# Patient Record
Sex: MALE | Race: WHITE | Employment: FULL TIME | URBAN - METROPOLITAN AREA
[De-identification: names, ages, dates, MRNs, and addresses within clinical notes are randomized per-mention and may not be internally consistent; named-entity substitution may affect disease eponyms.]

---

## 2017-06-05 ENCOUNTER — OFFICE VISIT (OUTPATIENT)
Dept: INTERNAL MEDICINE | Facility: CLINIC | Age: 37
End: 2017-06-05
Payer: COMMERCIAL

## 2017-06-05 VITALS
BODY MASS INDEX: 46.33 KG/M2 | DIASTOLIC BLOOD PRESSURE: 96 MMHG | RESPIRATION RATE: 20 BRPM | WEIGHT: 315 LBS | TEMPERATURE: 98.4 F | SYSTOLIC BLOOD PRESSURE: 138 MMHG | OXYGEN SATURATION: 97 % | HEART RATE: 94 BPM

## 2017-06-05 DIAGNOSIS — R09.81 SINUS CONGESTION: ICD-10-CM

## 2017-06-05 DIAGNOSIS — R07.0 THROAT PAIN: Primary | ICD-10-CM

## 2017-06-05 LAB
DEPRECATED S PYO AG THROAT QL EIA: NORMAL
MICRO REPORT STATUS: NORMAL
SPECIMEN SOURCE: NORMAL

## 2017-06-05 PROCEDURE — 87880 STREP A ASSAY W/OPTIC: CPT | Performed by: INTERNAL MEDICINE

## 2017-06-05 PROCEDURE — 99213 OFFICE O/P EST LOW 20 MIN: CPT | Performed by: INTERNAL MEDICINE

## 2017-06-05 PROCEDURE — 87081 CULTURE SCREEN ONLY: CPT | Performed by: INTERNAL MEDICINE

## 2017-06-05 RX ORDER — CEFUROXIME AXETIL 500 MG/1
500 TABLET ORAL 2 TIMES DAILY
Qty: 20 TABLET | Refills: 0 | Status: SHIPPED | OUTPATIENT
Start: 2017-06-05 | End: 2019-04-18

## 2017-06-05 ASSESSMENT — PAIN SCALES - GENERAL: PAINLEVEL: SEVERE PAIN (7)

## 2017-06-05 NOTE — NURSING NOTE
"Chief Complaint   Patient presents with     Pharyngitis     sore throat for five days and fever       Initial BP (!) 138/96  Pulse 94  Temp 98.4  F (36.9  C) (Temporal)  Resp 20  Wt (!) 344 lb (156 kg)  SpO2 97%  BMI 46.33 kg/m2 Estimated body mass index is 46.33 kg/(m^2) as calculated from the following:    Height as of 11/14/16: 6' 0.25\" (1.835 m).    Weight as of this encounter: 344 lb (156 kg).  Medication Reconciliation: complete    "

## 2017-06-05 NOTE — PROGRESS NOTES
SUBJECTIVE:                                                    Cristobal De La Rosa is a 36 year old male who presents to clinic today for the following health issues:      Chief Complaint   Patient presents with     Pharyngitis     sore throat for five days and fever     Sore throat and fever for 5 days.  No runny nose.      Strep is negative.      Has morning drainage.  History of sinus congestion.     No past medical history on file.  No current outpatient prescriptions on file.     No current facility-administered medications for this visit.      Social History   Substance Use Topics     Smoking status: Never Smoker     Smokeless tobacco: Current User     Types: Chew     Alcohol use 0.0 oz/week     0 Standard drinks or equivalent per week     Physical Exam  BP (!) 138/96  Pulse 94  Temp 98.4  F (36.9  C) (Temporal)  Resp 20  Wt (!) 344 lb (156 kg)  SpO2 97%  BMI 46.33 kg/m2  General Appearance-healthy, alert, mild distress  ENT-ears are clear, tonsils are touching, he is able open and close his job. Mild adenopathy  Cardiac-regular rate and rhythm  with normal S1, S2 ; no murmur, rub or gallops  Lungs-clear to auscultation    ASSESSMENT:  Young patient, 36 years old, who has 2 young children. Unfortunately his dad just passed away this weekend. She is having a sore throat for 5 days, blood pressure slightly elevated. His strep was negative but he does have large tonsils, infectious symptoms and despite only 5 days and to treat him through the stress of his father passing away in his exposures we will treat empirically at this time. Patient is given warning of side effects of Ceftin 500 mg twice a day.    Electronically signed by Sathya Villalpando MD

## 2017-06-05 NOTE — MR AVS SNAPSHOT
"              After Visit Summary   6/5/2017    Cristobal De La Rosa    MRN: [id number]           Patient Information     Date Of Birth          1980        Visit Information        Provider Department      6/5/2017 3:45 PM Sathya Villalpando MD [facility]        Today's Diagnoses     Throat pain    -  1       Follow-ups after your visit        Your next 10 appointments already scheduled     Jun 05, 2017  3:45 PM CDT   [facility] with Sath[facility]pando [facility]   [facility]    [address] [place] [id number] [phone]              Who to contact     If you have questions or need follow up information about today's clinic visit or your schedule please contact [facility] directly at [phone].  Normal or non-critical lab and imaging results will be communicated to you by MyChart, letter or phone within 4 business days after the clinic has received the results. If you do not hear from us within 7 days, please contact the clinic through MyChart or phone. If you have a critical or abnormal lab result, we will notify you by phone as soon as possible.  Submit refill requests through [facility] or call your pharmacy and they will forward the refill request to us. Please allow 3 business days for your refill to be completed.          Additional Information About Your Visit        [facility] Information     [facility] lets you send messages to your doctor, view your test results, renew your prescriptions, schedule appointments and more. To sign up, go to [email or web address] . Click on \"Log in\" on the left side of the screen, which will take you to the Welcome page. Then click on \"Sign up Now\" on the right side of the page.     You will be asked to enter the access code listed below, as well as some personal information. Please follow the directions to create your username and password.     Your access code is: [email or web address]  Expires: 9/3/2017  3:40 " PM     Your access code will  in 90 days. If you need help or a new code, please call your [facility] clinic or [phone].        Care EveryWhere ID     This is your Care EveryWhere ID. This could be used by other organizations to access your [facility] medical records  [id number]        Your Vitals Were     Pulse Temperature Respirations Pulse Oximetry BMI (Body Mass Index)       94 98.4  F (36.9  C) (Temporal) 20 97% 46.33 kg/m2        Blood Pressure from Last 3 Encounters:   17 (!) 138/96   16 (!) 136/96    Weight from Last 3 Encounters:   17 (!) 344 lb (156 kg)   16 (!) 366 lb (166 kg)              We Performed the Following     Strep, Rapid Screen        Primary Care Provider    None       No address on file        Thank you!     Thank you for choosing [facility]  for your care. Our goal is always to provide you with excellent care. Hearing back from our patients is one way we can continue to improve our services. Please take a few minutes to complete the written survey that you may receive in the mail after your visit with us. Thank you!             Your Updated Medication List - Protect others around you: Learn how to safely use, store and throw away your medicines at www.disposemymeds.org.      Notice  As of 2017  3:40 PM    You have not been prescribed any medications.

## 2017-06-07 LAB
BACTERIA SPEC CULT: NORMAL
MICRO REPORT STATUS: NORMAL
SPECIMEN SOURCE: NORMAL

## 2017-11-30 ENCOUNTER — OFFICE VISIT (OUTPATIENT)
Dept: UROLOGY | Facility: OTHER | Age: 37
End: 2017-11-30
Payer: COMMERCIAL

## 2017-11-30 VITALS — HEART RATE: 96 BPM | DIASTOLIC BLOOD PRESSURE: 88 MMHG | RESPIRATION RATE: 16 BRPM | SYSTOLIC BLOOD PRESSURE: 130 MMHG

## 2017-11-30 DIAGNOSIS — Z30.09 VASECTOMY EVALUATION: Primary | ICD-10-CM

## 2017-11-30 PROCEDURE — 99203 OFFICE O/P NEW LOW 30 MIN: CPT | Performed by: UROLOGY

## 2017-11-30 NOTE — MR AVS SNAPSHOT
After Visit Summary   11/30/2017    Cristobal De La Rosa    MRN: [id number]           Patient Information     Date Of Birth          1980        Visit Information        Provider Department      11/30/2017 9:45 AM Julian Carter MD [facility]'s Diagnoses     Vasectomy evaluation    -  1      Care Instructions    Your Vasectomy is scheduled for 12/21/2018 @ 9am .  Please call [phone] if you need to reschedule this appointment or if you have any question.     Preparation for Vasectomy:  -Shave the hair away from the base of your penis and around your testicles.  -Wear snug underwear the day of the vasectomy to support your testicles.  -Do not take aspirin, ibuprofen, advil, motrin, aleve products one week prior to your vasectomy.  -Arrange for a  if you take any medication for relaxation from the physician.      General Vasectomy Information    Vasectomy is a surgery.  If it is successful, it will be impossible for you to ever father children.  The following information regards the male sterilization done by an operation called a vasectomy.  This is done in the physician's office.    The operation done to sterilize the male is easier, safer and much less expensive than the operation done to sterilize the woman.    Sterilization should be considered permanent.  For most males, once the operation is done, it can never me undone.  There are attempts made occasionally to reconnect the tubes that have been cut during the procedure, but this is very difficult and expensive and works only about 50-70% of the time.  In order for any of the physicians in our clinic to do a vasectomy, we require that you consider this a permanent form a sterilization.    A vasectomy can be done at any time, but it is best to think about having it done when you can take at least one day off from work and any excess activities.    Your decision to have a vasectomy should only be made  with the following facts clear in mind.    1. First, a vasectomy is only one of several means of birth control.  Many form of temporary contraception are available.  If you have any questions about other methods, please discuss this with your physician.    2. A vasectomy may be unsuccessful in approximately one out of 1000 couples per year.  This occurs when the tubes which are cut during the procedure reconnect themselves.  Sterility cannot be guaranteed.    3. You should be aware that it is the current belief of the medical profession that a vasectomy procedure does not alter a male physically, physiologically or sexually.  Because each person is a unique individual, there is always the possibility of an adverse phychiatric reaction.  This can be best avoided by being very comfortable in your own mind that you want to have this done, and that you do not want to father any children in the future.  If this is not clear in your mind, this should be further discussed with your physician.    4. You will not notice a change in the volume of your ejaculate since sperm is a very small amount of the semen and it is only the sperm that is stopped from entering the ejaculate after a vasectomy.  Your prostate and seminal vesicle glands really supply most of the semen and this is not at all decreased after a vasectomy.  Also there is no effect on the male hormones.    5. You do not become sterile immediately following a vasectomy due to the fact that there is still sperm remaining in your system that must be eliminated by ejaculation.  For this reason, your sexual partner could still become pregnant for a period of time following the vasectomy operation.  It is necessary that contraceptive measures be used until you receive confirmation from your physician that you are sterile.  It takes approximately 12 ejaculations to clear the semen of sperm, but this can differ in different men.  For this reason, it is very important that  "your semen be checked for sperm before you are considered sterile, and this should be done approximately 12 weeks after your vasectomy.      6. Vasectomy has risks and benefits.  Among the risks are possible complications resulting from the procedure.  These risks include but are not limited to:   A.  Bleeding, infection, or hematoma occuring during or in the recovery period   from the procedure.   B.  Sperm granuloma or a small pea to walnut sized lump which is a collection of   scar tissue and sperm in your scrotal sack and remains permanently   C.  There may be an increased risk of prostate cancer although the data is   unclear.          Follow-ups after your visit        Your next 10 appointments already scheduled     Dec 21, 2017  9:00 AM CST   [facility]    [address] [place] [id number] [phone]              Who to contact     If you have questions or need follow up information about today's clinic visit or your schedule please contact [facility] directly at [phone].  Normal or non-critical lab and imaging results will be communicated to you by MyChart, letter or phone within 4 business days after the clinic has received the results. If you do not hear from us within 7 days, please contact the clinic through [facility] or phone. If you have a critical or abnormal lab result, we will notify you by phone as soon as possible.  Submit refill requests through [facility] or call your pharmacy and they will forward the refill request to us. Please allow 3 business days for your refill to be completed.          Additional Information About Your Visit        [facility] [facility] [employer] Information     [facility] lets you send messages to your doctor, view your test results, renew your prescriptions, schedule appointments and more. To sign up, go to [email or web address] . Click on \"Log in\" on the left side of the screen, " "which will take you to the Welcome page. Then click on \"Sign up Now\" on the right side of the page.     You will be asked to enter the access code listed below, as well as some personal information. Please follow the directions to create your username and password.     Your access code is: [email or web address]  Expires: 2017  9:42 AM     Your access code will  in 90 days. If you need help or a new code, please call your [place] clinic or [phone].        Care EveryWhere ID     This is your Care EveryWhere ID. This could be used by other organizations to access your [facility] medical records  [id number]        Your Vitals Were     Pulse Respirations                96 16           Blood Pressure from Last 3 Encounters:   17 130/88   17 (!) 138/96   16 (!) 136/96    Weight from Last 3 Encounters:   17 (!) 344 lb (156 kg)   16 (!) 366 lb (166 kg)              Today, you had the following     No orders found for display       Primary Care Provider Fax #    Physician No Ref-Primary [phone]       No address on file        Equal Access to Services     [facility]ZZ[facility] : Hadii floresita javiero Soantonio, waaxda luqadaha, qaybta kaalmada aderobertoyada, zander schwarz . So [facility] [phone].    ATENCIÓN: Si habla español, tiene a malcolm disposición servicios gratuitos de asistencia lingüística. Tj al [phone].    We comply with applicable federal civil rights laws and [place] laws. We do not discriminate on the basis of race, color, national origin, age, disability, sex, sexual orientation, or gender identity.            Thank you!     Thank you for choosing [facility]  for your care. Our goal is always to provide you with excellent care. Hearing back from our patients is one way we can continue to improve our services. Please take a few minutes to complete the written survey that you may receive in the mail after your visit with us. Thank you!   "           Your Updated Medication List - Protect others around you: Learn how to safely use, store and throw away your medicines at www.disposemymeds.org.          This list is accurate as of: 11/30/17 10:02 AM.  Always use your most recent med list.                   Brand Name Dispense Instructions for use Diagnosis    cefuroxime 500 MG tablet    CEFTIN    20 tablet    Take 1 tablet (500 mg) by mouth 2 times daily    Throat pain, Sinus congestion

## 2017-11-30 NOTE — NURSING NOTE
"Chief Complaint   Patient presents with     Consult     Vasectomy consult        Initial /88 (BP Location: Left arm, Patient Position: Chair, Cuff Size: Adult Large)  Pulse 96  Resp 16 Estimated body mass index is 46.33 kg/(m^2) as calculated from the following:    Height as of 11/14/16: 6' 0.25\" (1.835 m).    Weight as of 6/5/17: 344 lb (156 kg).  Medication Reconciliation: complete     Jennifer Jarquin RN      "

## 2017-11-30 NOTE — PROGRESS NOTES
Vasectomy Consult    Reason for visit:   Discuss as a method of birth control/sterilization.  He is interested in vasectomy scrotally.  Patient has 3 children and desires sterilization.  Does not want to use condom or having partners on birth control pills.  He has no erections problem.  He has no urinary complaints.    Current Outpatient Prescriptions   Medication Sig Dispense Refill     cefuroxime (CEFTIN) 500 MG tablet Take 1 tablet (500 mg) by mouth 2 times daily (Patient not taking: Reported on 11/30/2017) 20 tablet 0     Allergies   Allergen Reactions     Penicillins      No past medical history on file.  No past surgical history on file.   No family history on file.  Social History     Social History     Marital status:      Spouse name: N/A     Number of children: N/A     Years of education: N/A     Social History Main Topics     Smoking status: Never Smoker     Smokeless tobacco: Current User     Types: Chew     Alcohol use 0.0 oz/week     0 Standard drinks or equivalent per week     Drug use: No     Sexual activity: Yes     Partners: Female     Other Topics Concern     None     Social History Narrative       REVIEW OF SYSTEMS  =================  C: NEGATIVE for fever, chills, change in weight  I: NEGATIVE for worrisome rashes, moles or lesions  E/M: NEGATIVE for ear, mouth and throat problems  R: NEGATIVE for significant cough or SHORTNESS OF BREATH  CV:  NEGATIVE for chest pain, palpitations or peripheral edema  GI: NEGATIVE for nausea, abdominal pain, heartburn, or change in bowel habits  NEURO: NEGATIVE numbness/weakness  : see HPI  PSYCH: NEGATIVE depression/anxiety  LYmph: no new enlarged lymph nodes  Ortho: no new trauma/movements      Physical Exam:  /88 (BP Location: Left arm, Patient Position: Chair, Cuff Size: Adult Large)  Pulse 96  Resp 16   Patient is pleasant, in no acute distress, good general condition.  HEENT:  Normalcephalic, atraumatic  Lung: no evidence of respiratory  distress    Abdomen: Soft, nondistended, non tender. No masses. No rebound or guarding.   Exam: penis no d/c testis no masses bilateral vas palpated no scrotal lesion  Skin: Warm and dry.  No redness.  Neuro: grossly normal  Psych normal mood and affect  Musculoskeletal  moving all extremities        Discussed    That vasectomy is permanent method of birth control.    That vasectomy can fail due to recanalization of the vas even many months/years later.    That he needs 2 negative sperm checks to be considered sterile    That there are other methods that are not permanent and also that the sperm can be frozen for later use.    How the technique is performed, risks of infection, bleeding, damage to the testes vessels and testes atrophy    Long term complication such as chronic and difficult to treat testes pain and questionable increase incidence of prostate cancer    That the procedure can be done at the clinic or hospital OR        Plan:    Stop Aspirin  Will schedule Vasectomy in the future

## 2017-11-30 NOTE — PATIENT INSTRUCTIONS
Your Vasectomy is scheduled for 12/21/2018 @ 9am .  Please call [phone] if you need to reschedule this appointment or if you have any question.     Preparation for Vasectomy:  -Shave the hair away from the base of your penis and around your testicles.  -Wear snug underwear the day of the vasectomy to support your testicles.  -Do not take aspirin, ibuprofen, advil, motrin, aleve products one week prior to your vasectomy.  -Arrange for a  if you take any medication for relaxation from the physician.      General Vasectomy Information    Vasectomy is a surgery.  If it is successful, it will be impossible for you to ever father children.  The following information regards the male sterilization done by an operation called a vasectomy.  This is done in the physician's office.    The operation done to sterilize the male is easier, safer and much less expensive than the operation done to sterilize the woman.    Sterilization should be considered permanent.  For most males, once the operation is done, it can never me undone.  There are attempts made occasionally to reconnect the tubes that have been cut during the procedure, but this is very difficult and expensive and works only about 50-70% of the time.  In order for any of the physicians in our clinic to do a vasectomy, we require that you consider this a permanent form a sterilization.    A vasectomy can be done at any time, but it is best to think about having it done when you can take at least one day off from work and any excess activities.    Your decision to have a vasectomy should only be made with the following facts clear in mind.    1. First, a vasectomy is only one of several means of birth control.  Many form of temporary contraception are available.  If you have any questions about other methods, please discuss this with your physician.    2. A vasectomy may be unsuccessful in approximately one out of 1000 couples per year.  This occurs when the  tubes which are cut during the procedure reconnect themselves.  Sterility cannot be guaranteed.    3. You should be aware that it is the current belief of the medical profession that a vasectomy procedure does not alter a male physically, physiologically or sexually.  Because each person is a unique individual, there is always the possibility of an adverse phychiatric reaction.  This can be best avoided by being very comfortable in your own mind that you want to have this done, and that you do not want to father any children in the future.  If this is not clear in your mind, this should be further discussed with your physician.    4. You will not notice a change in the volume of your ejaculate since sperm is a very small amount of the semen and it is only the sperm that is stopped from entering the ejaculate after a vasectomy.  Your prostate and seminal vesicle glands really supply most of the semen and this is not at all decreased after a vasectomy.  Also there is no effect on the male hormones.    5. You do not become sterile immediately following a vasectomy due to the fact that there is still sperm remaining in your system that must be eliminated by ejaculation.  For this reason, your sexual partner could still become pregnant for a period of time following the vasectomy operation.  It is necessary that contraceptive measures be used until you receive confirmation from your physician that you are sterile.  It takes approximately 12 ejaculations to clear the semen of sperm, but this can differ in different men.  For this reason, it is very important that your semen be checked for sperm before you are considered sterile, and this should be done approximately 12 weeks after your vasectomy.      6. Vasectomy has risks and benefits.  Among the risks are possible complications resulting from the procedure.  These risks include but are not limited to:   A.  Bleeding, infection, or hematoma occuring during or in the  recovery period   from the procedure.   B.  Sperm granuloma or a small pea to walnut sized lump which is a collection of   scar tissue and sperm in your scrotal sack and remains permanently   C.  There may be an increased risk of prostate cancer although the data is   unclear.

## 2017-12-21 ENCOUNTER — OFFICE VISIT (OUTPATIENT)
Dept: UROLOGY | Facility: OTHER | Age: 37
End: 2017-12-21
Payer: COMMERCIAL

## 2017-12-21 DIAGNOSIS — Z30.2 ENCOUNTER FOR VASECTOMY: Primary | ICD-10-CM

## 2017-12-21 PROCEDURE — 99000 SPECIMEN HANDLING OFFICE-LAB: CPT | Performed by: UROLOGY

## 2017-12-21 PROCEDURE — 88304 TISSUE EXAM BY PATHOLOGIST: CPT | Performed by: UROLOGY

## 2017-12-21 PROCEDURE — 99207 ZZC DROP WITH A PROCEDURE: CPT | Mod: 25 | Performed by: UROLOGY

## 2017-12-21 PROCEDURE — 55250 REMOVAL OF SPERM DUCT(S): CPT | Performed by: UROLOGY

## 2017-12-21 NOTE — PROGRESS NOTES
Patient returns to clinic for vasectomy.  After informed consent has been obtained, he was placed supine in the OR table.  He was draped and prepped in usual surgical fashion.  2% lidocaine used for local anesthetics.  A scalpel less technique was used.  A small nick was made in the skin after vas identified/clamped.  Surrounding tissue was  from vas.  A small portion of vas was excised.  Lumen of vas burned.  Vas tied with silk sutures after proximal portion closed.  3.0 chromic suture to close skin opening.  This was again repeated on the other side.  Patient tolerated the procedure well.  Post vas instructions given to patient today.

## 2017-12-21 NOTE — MR AVS SNAPSHOT
"              After Visit Summary   12/21/2017    Cristobal De La Rosa    MRN: [id number]           Patient Information     Date Of Birth          1980        Visit Information        Provider Department      12/21/2017 9:00 AM Julian Carter MD [facility]        Today's Diagnoses     Encounter for vasectomy    -  1       Follow-ups after your visit        Future tests that were ordered for you today     Open Standing Orders        Priority Remaining Interval Expires Ordered    Semen Post Vasectomy Qual ([place], [place], [place]) Routine 2/2 prn 12/21/2018 12/21/2017            Who to contact     If you have questions or need follow up information about today's clinic visit or your schedule please contact [facility] directly at [phone].  Normal or non-critical lab and imaging results will be communicated to you by [employer], letter or phone within 4 business days after the clinic has received the results. If you do not hear from us within 7 days, please contact the clinic through [employer] or phone. If you have a critical or abnormal lab result, we will notify you by phone as soon as possible.  Submit refill requests through [employer] or call your pharmacy and they will forward the refill request to us. Please allow 3 business days for your refill to be completed.          Additional Information About Your Visit        [employer] Information     [employer] lets you send messages to your doctor, view your test results, renew your prescriptions, schedule appointments and more. To sign up, go to [email or web address] . Click on \"Log in\" on the left side of the screen, which will take you to the Welcome page. Then click on \"Sign up Now\" on the right side of the page.     You will be asked to enter the access code listed below, as well as some personal information. Please follow the directions to create your username and password.     Your access code is: [email or web address]  Expires: 3/21/2018  9:35 AM   "   Your access code will  in 90 days. If you need help or a new code, please call your [facility] clinic or [phone].        Care EveryWhere ID     This is your Care EveryWhere ID. This could be used by other organizations to access your [facility] medical records  [id number]         Blood Pressure from Last 3 Encounters:   17 130/88   17 (!) 138/96   16 (!) 136/96    Weight from Last 3 Encounters:   17 (!) 156 kg (344 lb)   16 (!) 166 kg (366 lb)              We Performed the Following     CL SPECIMEN HANDLING,DR OFF->LAB     Surgical pathology exam     VASECTOMY UNILAT/BILAT W POSTOP SEMEN        Primary Care Provider Fax #    Physician No Ref-Primary [phone]       No address on file        Equal Access to Services     AARON GREER : Ambar Ac, waaxbranden lurickey, qaybta kaalmabranden rush, zander schwarz . So [facility] [phone].    ATENCIÓN: Si habla español, tiene a malcolm disposición servicios gratuitos de asistencia lingüística. Tj al [phone].    We comply with applicable federal civil rights laws and [place] laws. We do not discriminate on the basis of race, color, national origin, age, disability, sex, sexual orientation, or gender identity.            Thank you!     Thank you for choosing [facility]  for your care. Our goal is always to provide you with excellent care. Hearing back from our patients is one way we can continue to improve our services. Please take a few minutes to complete the written survey that you may receive in the mail after your visit with us. Thank you!             Your Updated Medication List - Protect others around you: Learn how to safely use, store and throw away your medicines at www.disposemymeds.org.          This list is accurate as of: 17  9:35 AM.  Always use your most recent med list.                   Brand Name Dispense Instructions for use Diagnosis     cefuroxime 500 MG tablet    CEFTIN    20 tablet    Take 1 tablet (500 mg) by mouth 2 times daily    Throat pain, Sinus congestion

## 2017-12-27 LAB — COPATH REPORT: NORMAL

## 2018-07-03 DIAGNOSIS — Z30.2 ENCOUNTER FOR VASECTOMY: ICD-10-CM

## 2018-07-03 LAB — SPERM P VAS SMN QL MICRO: NORMAL

## 2018-07-03 PROCEDURE — 89321 SEMEN ANAL SPERM DETECTION: CPT | Performed by: UROLOGY

## 2019-04-16 NOTE — PROGRESS NOTES
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 His blood pressure was 160/unknown. He reports it has been high the last few times. He reports he otherwise feels well. He denies headaches, vision changes, chest pain, shortness of breath or paresthesias. His father recently passed away from heart attack and stroke. He did have high blood pressure. He does not smoke but he uses chewing tobacco. Rare alcohol use.     Answers for HPI/ROS submitted by the patient on 4/18/2019   Chronic problems general questions HPI Form  PHQ9 TOTAL SCORE: 3  CHAD 7 TOTAL SCORE: 4    Additional history: as documented    Reviewed and updated as needed this visit by clinical staff       Reviewed and updated as needed this visit by Provider         Patient Active Problem List   Diagnosis     Morbid obesity (H)     No past surgical history on file.    Social History     Tobacco Use     Smoking status: Never Smoker     Smokeless tobacco: Current User     Types: Chew   Substance Use Topics     Alcohol use: Yes     Alcohol/week: 0.0 oz     Family History   Problem Relation Age of Onset     Heart Disease Father      Cerebrovascular Disease Father      Hypertension Father          Current Outpatient Medications   Medication Sig Dispense Refill     hydrochlorothiazide (HYDRODIURIL) 25 MG tablet Take 1 tablet (25 mg) by mouth daily 30 tablet 1     Allergies   Allergen Reactions     Penicillins      BP  Readings from Last 3 Encounters:   04/18/19 (!) 140/94   11/30/17 130/88   06/05/17 (!) 138/96    Wt Readings from Last 3 Encounters:   04/18/19 (!) 161 kg (355 lb)   06/05/17 (!) 156 kg (344 lb)   11/14/16 (!) 166 kg (366 lb)         ROS:  Constitutional, HEENT, cardiovascular, pulmonary, GI, , musculoskeletal, neuro, skin, endocrine and psych systems are negative, except as otherwise noted.    OBJECTIVE:     BP (!) 140/94   Pulse 64   Temp 98.8  F (37.1  C) (Temporal)   Resp 18   Wt (!) 161 kg (355 lb)   SpO2 98%   BMI 47.81 kg/m    Body mass index is 47.81 kg/m .  GENERAL: healthy, alert and no distress  EYES: Eyes grossly normal to inspection, PERRL and conjunctivae and sclerae normal  HENT: ear canals and TM's normal, nose and mouth without ulcers or lesions  NECK: no adenopathy, no asymmetry, masses, or scars and thyroid normal to palpation  RESP: lungs clear to auscultation - no rales, rhonchi or wheezes  CV: regular rate and rhythm, normal S1 S2, no S3 or S4, no murmur, click or rub, no peripheral edema and peripheral pulses strong  SKIN: no suspicious lesions or rashes  PSYCH: mentation appears normal, affect normal/bright    Diagnostic Test Results:  none     ASSESSMENT/PLAN:     1. Essential hypertension  Medication started as below. Recommended dietary modifications with DASH diet. Exercise encouraged. Patient will follow-up in 1 month for recheck and labs.   - hydrochlorothiazide (HYDRODIURIL) 25 MG tablet; Take 1 tablet (25 mg) by mouth daily  Dispense: 30 tablet; Refill: 1    2. Morbid obesity (H)  Weight addressed today. Encouraged ongoing diet and exercise modifications for healthy weight and lifestyle.     The patient indicates understanding of these issues and agrees with the plan.    Pao Reddy PA-C  [facility]

## 2019-04-18 ENCOUNTER — OFFICE VISIT (OUTPATIENT)
Dept: FAMILY MEDICINE | Facility: OTHER | Age: 39
End: 2019-04-18
Payer: COMMERCIAL

## 2019-04-18 VITALS
WEIGHT: 315 LBS | HEART RATE: 64 BPM | TEMPERATURE: 98.8 F | BODY MASS INDEX: 47.81 KG/M2 | OXYGEN SATURATION: 98 % | SYSTOLIC BLOOD PRESSURE: 140 MMHG | RESPIRATION RATE: 18 BRPM | DIASTOLIC BLOOD PRESSURE: 94 MMHG

## 2019-04-18 DIAGNOSIS — I10 ESSENTIAL HYPERTENSION: Primary | ICD-10-CM

## 2019-04-18 DIAGNOSIS — E66.01 MORBID OBESITY (H): ICD-10-CM

## 2019-04-18 PROCEDURE — 99213 OFFICE O/P EST LOW 20 MIN: CPT | Performed by: PHYSICIAN ASSISTANT

## 2019-04-18 RX ORDER — HYDROCHLOROTHIAZIDE 25 MG/1
25 TABLET ORAL DAILY
Qty: 30 TABLET | Refills: 1 | Status: SHIPPED | OUTPATIENT
Start: 2019-04-18 | End: 2019-05-17

## 2019-04-18 ASSESSMENT — ANXIETY QUESTIONNAIRES
4. TROUBLE RELAXING: NOT AT ALL
6. BECOMING EASILY ANNOYED OR IRRITABLE: MORE THAN HALF THE DAYS
7. FEELING AFRAID AS IF SOMETHING AWFUL MIGHT HAPPEN: NOT AT ALL
GAD7 TOTAL SCORE: 4
5. BEING SO RESTLESS THAT IT IS HARD TO SIT STILL: NOT AT ALL
1. FEELING NERVOUS, ANXIOUS, OR ON EDGE: MORE THAN HALF THE DAYS
7. FEELING AFRAID AS IF SOMETHING AWFUL MIGHT HAPPEN: NOT AT ALL
3. WORRYING TOO MUCH ABOUT DIFFERENT THINGS: NOT AT ALL
GAD7 TOTAL SCORE: 4
GAD7 TOTAL SCORE: 4
2. NOT BEING ABLE TO STOP OR CONTROL WORRYING: NOT AT ALL

## 2019-04-18 ASSESSMENT — PATIENT HEALTH QUESTIONNAIRE - PHQ9
SUM OF ALL RESPONSES TO PHQ QUESTIONS 1-9: 3
SUM OF ALL RESPONSES TO PHQ QUESTIONS 1-9: 3

## 2019-04-18 ASSESSMENT — PAIN SCALES - GENERAL: PAINLEVEL: NO PAIN (0)

## 2019-04-19 PROBLEM — I10 ESSENTIAL HYPERTENSION: Status: ACTIVE | Noted: 2019-04-19

## 2019-04-19 ASSESSMENT — ANXIETY QUESTIONNAIRES: GAD7 TOTAL SCORE: 4

## 2019-04-19 ASSESSMENT — PATIENT HEALTH QUESTIONNAIRE - PHQ9: SUM OF ALL RESPONSES TO PHQ QUESTIONS 1-9: 3

## 2019-05-10 NOTE — PROGRESS NOTES
"  SUBJECTIVE:   Cristobal De La Rosa is a 38 year old male who presents to clinic today for the following health issues:    HPI  Hypertension Follow-up      Outpatient blood pressures are not being checked.    Low Salt Diet: low salt    Patient presents today for blood pressure follow-up. He was seen 1 month ago and started on hydrochlorothiazide. Patient reports he is tolerated this well. Blood pressure is much better controlled. He has lost 6 pounds over the last month. Has been working on diet and exercise as well as cutting out salt. He has no issue remembering to take the medication or any side effects. Due for labs today.     Patient also presents for removal of sebaceous cyst on the right side of his scalp. This was discussed at the last office visit.     Additional history: as documented    Reviewed and updated as needed this visit by clinical staff       Reviewed and updated as needed this visit by Provider       Patient Active Problem List   Diagnosis     Morbid obesity (H)     Essential hypertension     No past surgical history on file.    Social History     Tobacco Use     Smoking status: Never Smoker     Smokeless tobacco: Current User     Types: Chew   Substance Use Topics     Alcohol use: Yes     Alcohol/week: 0.0 oz     Family History   Problem Relation Age of Onset     Heart Disease Father      Cerebrovascular Disease Father      Hypertension Father          Current Outpatient Medications   Medication Sig Dispense Refill     hydrochlorothiazide (HYDRODIURIL) 25 MG tablet Take 1 tablet (25 mg) by mouth daily 90 tablet 1     Allergies   Allergen Reactions     Penicillins       ROS: 10 point ROS neg other than the symptoms noted above in the HPI.    /84   Pulse 70   Temp 99.3  F (37.4  C) (Temporal)   Resp 16   Ht 1.854 m (6' 1\")   Wt (!) 157.9 kg (348 lb)   SpO2 94%   BMI 45.91 kg/m      EXAM:  Constitutional: healthy, alert and no distress   Cardiovascular: negative, PMI normal. No lifts, " heaves, or thrills. RRR. No murmurs, clicks gallops or rub  Respiratory: negative, Percussion normal. Good diaphragmatic excursion. Lungs clear  Psychiatric: mentation appears normal and affect normal/bright  SKIN: 1 cm sebaceous cyst present on the right parietal scalp area. Mild discomfort with palpation.    PROCEDURE:  Procedure: Patient gives verbal permission for this procedure.  Area was cleaned and alcohol swabs x 3. With the patient comfortable I anesthetized the affected area with Lidocaine 1% with epinephrine.  Proper anesthesia obtained with 2 mL. Anesthesia was confirmed with the patient.  I used an 11 blade to remove the cyst entirely. The area was closed with 3 simple interrupted sutures of 5-0 prolene. Patient tolerated the procedure well.     Assessment & Plan     1. Essential hypertension  Blood pressure well controlled. Encouraged ongoing diet and exercise modifications for weight loss. Labs as below. Will recheck in 6 months.   - hydrochlorothiazide (HYDRODIURIL) 25 MG tablet; Take 1 tablet (25 mg) by mouth daily  Dispense: 90 tablet; Refill: 1  - Basic metabolic panel  (Ca, Cl, CO2, Creat, Gluc, K, Na, BUN)    2. Sebaceous cyst  Cyst removed completely today. Local wound care discussed. Suture removal in 10-14 days recommended.   - EXC BENIGN SKIN LESION FACE/EARS 0.6-1.0 CM    3. Lipid screening  - Lipid panel reflex to direct LDL Non-fasting    4. Need for diphtheria-tetanus-pertussis (Tdap) vaccine  - TDAP, IM (10 - 64 YRS) - Adacel    The patient indicates understanding of these issues and agrees with the plan.    Pao Reddy PA-C  [facility]

## 2019-05-17 ENCOUNTER — OFFICE VISIT (OUTPATIENT)
Dept: FAMILY MEDICINE | Facility: OTHER | Age: 39
End: 2019-05-17
Payer: COMMERCIAL

## 2019-05-17 VITALS
SYSTOLIC BLOOD PRESSURE: 132 MMHG | RESPIRATION RATE: 16 BRPM | WEIGHT: 315 LBS | BODY MASS INDEX: 41.75 KG/M2 | OXYGEN SATURATION: 94 % | HEART RATE: 70 BPM | HEIGHT: 73 IN | DIASTOLIC BLOOD PRESSURE: 84 MMHG | TEMPERATURE: 99.3 F

## 2019-05-17 DIAGNOSIS — Z13.220 LIPID SCREENING: ICD-10-CM

## 2019-05-17 DIAGNOSIS — L72.3 SEBACEOUS CYST: ICD-10-CM

## 2019-05-17 DIAGNOSIS — Z23 NEED FOR DIPHTHERIA-TETANUS-PERTUSSIS (TDAP) VACCINE: ICD-10-CM

## 2019-05-17 DIAGNOSIS — I10 ESSENTIAL HYPERTENSION: Primary | ICD-10-CM

## 2019-05-17 LAB
ANION GAP SERPL CALCULATED.3IONS-SCNC: 9 MMOL/L (ref 3–14)
BUN SERPL-MCNC: 16 MG/DL (ref 7–30)
CALCIUM SERPL-MCNC: 8.9 MG/DL (ref 8.5–10.1)
CHLORIDE SERPL-SCNC: 108 MMOL/L (ref 94–109)
CHOLEST SERPL-MCNC: 153 MG/DL
CO2 SERPL-SCNC: 26 MMOL/L (ref 20–32)
CREAT SERPL-MCNC: 0.92 MG/DL (ref 0.66–1.25)
GFR SERPL CREATININE-BSD FRML MDRD: >90 ML/MIN/{1.73_M2}
GLUCOSE SERPL-MCNC: 99 MG/DL (ref 70–99)
HDLC SERPL-MCNC: 46 MG/DL
LDLC SERPL CALC-MCNC: 72 MG/DL
NONHDLC SERPL-MCNC: 107 MG/DL
POTASSIUM SERPL-SCNC: 3.6 MMOL/L (ref 3.4–5.3)
SODIUM SERPL-SCNC: 143 MMOL/L (ref 133–144)
TRIGL SERPL-MCNC: 177 MG/DL

## 2019-05-17 PROCEDURE — 99213 OFFICE O/P EST LOW 20 MIN: CPT | Mod: 25 | Performed by: PHYSICIAN ASSISTANT

## 2019-05-17 PROCEDURE — 80061 LIPID PANEL: CPT | Performed by: PHYSICIAN ASSISTANT

## 2019-05-17 PROCEDURE — 11441 EXC FACE-MM B9+MARG 0.6-1 CM: CPT | Performed by: PHYSICIAN ASSISTANT

## 2019-05-17 PROCEDURE — 36415 COLL VENOUS BLD VENIPUNCTURE: CPT | Performed by: PHYSICIAN ASSISTANT

## 2019-05-17 PROCEDURE — 90715 TDAP VACCINE 7 YRS/> IM: CPT | Performed by: PHYSICIAN ASSISTANT

## 2019-05-17 PROCEDURE — 90471 IMMUNIZATION ADMIN: CPT | Performed by: PHYSICIAN ASSISTANT

## 2019-05-17 PROCEDURE — 80048 BASIC METABOLIC PNL TOTAL CA: CPT | Performed by: PHYSICIAN ASSISTANT

## 2019-05-17 RX ORDER — HYDROCHLOROTHIAZIDE 25 MG/1
25 TABLET ORAL DAILY
Qty: 90 TABLET | Refills: 1 | Status: SHIPPED | OUTPATIENT
Start: 2019-05-17

## 2019-05-17 ASSESSMENT — PAIN SCALES - GENERAL: PAINLEVEL: NO PAIN (0)

## 2019-05-17 ASSESSMENT — MIFFLIN-ST. JEOR: SCORE: 2557.4

## 2019-05-17 NOTE — LETTER
May 20, 2019      Cristobal De La Rosa  [id number] [facility] [address]        Dear ,    We are writing to inform you your lab tests look excellent.    Resulted Orders   Basic metabolic panel  (Ca, Cl, CO2, Creat, Gluc, K, Na, BUN)   Result Value Ref Range    Sodium 143 133 - 144 mmol/L    Potassium 3.6 3.4 - 5.3 mmol/L    Chloride 108 94 - 109 mmol/L    Carbon Dioxide 26 20 - 32 mmol/L    Anion Gap 9 3 - 14 mmol/L    Glucose 99 70 - 99 mg/dL    Urea Nitrogen 16 7 - 30 mg/dL    Creatinine 0.92 0.66 - 1.25 mg/dL    GFR Estimate >90 >60 mL/min/[1.73_m2]      Comment:      Non  GFR Calc  Starting 12/18/2018, serum creatinine based estimated GFR (eGFR) will be   calculated using the Chronic Kidney Disease Epidemiology Collaboration   (CKD-EPI) equation.      GFR Estimate If Black >90 >60 mL/min/[1.73_m2]      Comment:       GFR Calc  Starting 12/18/2018, serum creatinine based estimated GFR (eGFR) will be   calculated using the Chronic Kidney Disease Epidemiology Collaboration   (CKD-EPI) equation.      Calcium 8.9 8.5 - 10.1 mg/dL   Lipid panel reflex to direct LDL Non-fasting   Result Value Ref Range    Cholesterol 153 <200 mg/dL    Triglycerides 177 (H) <150 mg/dL      Comment:      Borderline high:  150-199 mg/dl  High:             200-499 mg/dl  Very high:       >499 mg/dl      HDL Cholesterol 46 >39 mg/dL    LDL Cholesterol Calculated 72 <100 mg/dL      Comment:      Desirable:       <100 mg/dl    Non HDL Cholesterol 107 <130 mg/dL       If you have any questions or concerns, please call the clinic at the number listed above.       Sincerely,        Pao Reddy PA-C

## 2019-05-17 NOTE — PROGRESS NOTES
Screening Questionnaire for Adult Immunization    Are you sick today?   No   Do you have allergies to medications, food, a vaccine component or latex?   No   Have you ever had a serious reaction after receiving a vaccination?   No   Do you have a long-term health problem with heart disease, lung disease, asthma, kidney disease, metabolic disease (e.g. diabetes), anemia, or other blood disorder?   No   Do you have cancer, leukemia, HIV/AIDS, or any other immune system problem?   No   In the past 3 months, have you taken medications that affect  your immune system, such as prednisone, other steroids, or anticancer drugs; drugs for the treatment of rheumatoid arthritis, Crohn s disease, or psoriasis; or have you had radiation treatments?   No   Have you had a seizure, or a brain or other nervous system problem?   No   During the past year, have you received a transfusion of blood or blood     products, or been given immune (gamma) globulin or antiviral drug?   No   For women: Are you pregnant or is there a chance you could become        pregnant during the next month?   No   Have you received any vaccinations in the past 4 weeks?   No     Immunization questionnaire answers were all negative.        Per orders of Pao Reddy, injection of Tdap given by Ed Morin. Patient instructed to remain in clinic for 15 minutes afterwards, and to report any adverse reaction to me immediately.       Screening performed by Ed Morin on 5/17/2019 at 4:56 PM.

## 2019-09-27 ENCOUNTER — HEALTH MAINTENANCE LETTER (OUTPATIENT)
Age: 39
End: 2019-09-27

## 2019-10-24 NOTE — PROGRESS NOTES
Subjective     Cristobal De La Rosa is a 38 year old male who presents to clinic today for the following health issues:    History of Present Illness        He eats 0-1 servings of fruits and vegetables daily.He consumes 2 sweetened beverage(s) daily.  He is taking medications regularly.     Hypertension Follow-up- ran out of medication x 1 month      Do you check your blood pressure regularly outside of the clinic? No     Are you following a low salt diet? No    Are your blood pressures ever more than 140 on the top number (systolic) OR more   than 90 on the bottom number (diastolic), for example 140/90? No     Patient presents today for follow-up of blood pressure. He reports he ran out of medication about 1 month ago. Was otherwise tolerating medications well without side effects. Monitoring salt in diet. Patient denies headaches, vision changes, chest pain, shortness of breath or paresthesias.    Patient Active Problem List   Diagnosis     Morbid obesity (H)     Essential hypertension     History reviewed. No pertinent surgical history.    Social History     Tobacco Use     Smoking status: Never Smoker     Smokeless tobacco: Current User     Types: Chew   Substance Use Topics     Alcohol use: Yes     Alcohol/week: 0.0 standard drinks     Family History   Problem Relation Age of Onset     Heart Disease Father      Cerebrovascular Disease Father      Hypertension Father          Current Outpatient Medications   Medication Sig Dispense Refill     hydrochlorothiazide (HYDRODIURIL) 25 MG tablet Take 1 tablet (25 mg) by mouth daily (Patient not taking: Reported on 10/29/2019) 90 tablet 1     Allergies   Allergen Reactions     Penicillins      BP Readings from Last 3 Encounters:   10/29/19 132/84   05/17/19 132/84   04/18/19 (!) 140/94    Wt Readings from Last 3 Encounters:   10/29/19 (!) 157.9 kg (348 lb)   05/17/19 (!) 157.9 kg (348 lb)   04/18/19 (!) 161 kg (355 lb)                    Reviewed and updated as needed this  "visit by Provider  Allergies  Meds  Problems  Med Hx  Surg Hx         Review of Systems   ROS COMP: Constitutional, HEENT, cardiovascular, pulmonary, gi and gu systems are negative, except as otherwise noted.      Objective    /84   Pulse 70   Temp 98.9  F (37.2  C) (Temporal)   Resp 18   Ht 1.854 m (6' 1\")   Wt (!) 157.9 kg (348 lb)   SpO2 97%   BMI 45.91 kg/m    Body mass index is 45.91 kg/m .  Physical Exam   GENERAL: healthy, alert and no distress  HENT: ear canals and TM's normal, nose and mouth without ulcers or lesions  NECK: no adenopathy, no asymmetry, masses, or scars and thyroid normal to palpation  RESP: lungs clear to auscultation - no rales, rhonchi or wheezes  CV: regular rate and rhythm, normal S1 S2, no S3 or S4, no murmur, click or rub, no peripheral edema and peripheral pulses strong  SKIN: no suspicious lesions or rashes  PSYCH: mentation appears normal, affect normal/bright    Diagnostic Test Results:  Labs reviewed in Epic  none         Assessment & Plan     1. Essential hypertension  Patient's blood pressure in normal range today. He has been off medication for the last 1 month. We discussed restarting medication vs lifestyle modifications. At this time patient would like to work on weight loss with diet and exercise to help improve his numbers. 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 Will have a nurse only BP check in clinic prior to this visit.     2. Morbid obesity (H)  Encouraged ongoing diet and exercise modifications.     The patient indicates understanding of these issues and agrees with the plan.    Pao Reddy PA-C  [facility]    "

## 2019-10-29 ENCOUNTER — OFFICE VISIT (OUTPATIENT)
Dept: FAMILY MEDICINE | Facility: OTHER | Age: 39
End: 2019-10-29

## 2019-10-29 VITALS
RESPIRATION RATE: 18 BRPM | HEIGHT: 73 IN | DIASTOLIC BLOOD PRESSURE: 84 MMHG | BODY MASS INDEX: 41.75 KG/M2 | WEIGHT: 315 LBS | OXYGEN SATURATION: 97 % | SYSTOLIC BLOOD PRESSURE: 132 MMHG | HEART RATE: 70 BPM | TEMPERATURE: 98.9 F

## 2019-10-29 DIAGNOSIS — I10 ESSENTIAL HYPERTENSION: Primary | ICD-10-CM

## 2019-10-29 DIAGNOSIS — E66.01 MORBID OBESITY (H): ICD-10-CM

## 2019-10-29 PROCEDURE — 99213 OFFICE O/P EST LOW 20 MIN: CPT | Performed by: PHYSICIAN ASSISTANT

## 2019-10-29 ASSESSMENT — MIFFLIN-ST. JEOR: SCORE: 2557.4
